# Patient Record
Sex: MALE | Race: WHITE
[De-identification: names, ages, dates, MRNs, and addresses within clinical notes are randomized per-mention and may not be internally consistent; named-entity substitution may affect disease eponyms.]

---

## 2020-01-24 ENCOUNTER — HOSPITAL ENCOUNTER (EMERGENCY)
Dept: HOSPITAL 74 - FER | Age: 50
Discharge: HOME | End: 2020-01-24
Payer: COMMERCIAL

## 2020-01-24 VITALS — DIASTOLIC BLOOD PRESSURE: 80 MMHG | TEMPERATURE: 98.3 F | SYSTOLIC BLOOD PRESSURE: 139 MMHG | HEART RATE: 71 BPM

## 2020-01-24 VITALS — BODY MASS INDEX: 24.5 KG/M2

## 2020-01-24 DIAGNOSIS — R10.32: Primary | ICD-10-CM

## 2020-01-24 DIAGNOSIS — R11.0: ICD-10-CM

## 2020-01-24 LAB
ALBUMIN SERPL-MCNC: 4 G/DL (ref 3.4–5)
ALP SERPL-CCNC: 54 U/L (ref 45–117)
ALT SERPL-CCNC: 32 U/L (ref 13–61)
ANION GAP SERPL CALC-SCNC: 5 MMOL/L (ref 8–16)
AST SERPL-CCNC: 28 U/L (ref 15–37)
BASOPHILS # BLD: 0.3 % (ref 0–2)
BILIRUB SERPL-MCNC: 0.7 MG/DL (ref 0.2–1)
BUN SERPL-MCNC: 12 MG/DL (ref 7–18)
CALCIUM SERPL-MCNC: 8.9 MG/DL (ref 8.5–10)
CHLORIDE SERPL-SCNC: 104 MMOL/L (ref 98–107)
CO2 SERPL-SCNC: 28 MMOL/L (ref 21–32)
CREAT SERPL-MCNC: 0.9 MG/DL (ref 0.55–1.3)
DEPRECATED RDW RBC AUTO: 13.2 % (ref 11.9–15.9)
EOSINOPHIL # BLD: 2.8 % (ref 0–4.5)
GLUCOSE SERPL-MCNC: 117 MG/DL (ref 74–106)
HCT VFR BLD CALC: 41.4 % (ref 35.4–49)
HGB BLD-MCNC: 13.3 GM/DL (ref 11.7–16.9)
LYMPHOCYTES # BLD: 17.6 % (ref 8–40)
MCH RBC QN AUTO: 25.9 PG (ref 25.7–33.7)
MCHC RBC AUTO-ENTMCNC: 32.1 G/DL (ref 32–35.9)
MCV RBC: 80.7 FL (ref 80–96)
MONOCYTES # BLD AUTO: 7.6 % (ref 3.8–10.2)
NEUTROPHILS # BLD: 71.7 % (ref 42.8–82.8)
PLATELET # BLD AUTO: 170 K/MM3 (ref 134–434)
PMV BLD: 9 FL (ref 7.5–11.1)
POTASSIUM SERPLBLD-SCNC: 4.2 MMOL/L (ref 3.5–5.1)
PROT SERPL-MCNC: 7.2 G/DL (ref 6.4–8.2)
RBC # BLD AUTO: 5.13 M/MM3 (ref 4–5.6)
SODIUM SERPL-SCNC: 137 MMOL/L (ref 136–145)
WBC # BLD AUTO: 6.7 K/MM3 (ref 4–10.8)

## 2020-01-24 PROCEDURE — 3E033NZ INTRODUCTION OF ANALGESICS, HYPNOTICS, SEDATIVES INTO PERIPHERAL VEIN, PERCUTANEOUS APPROACH: ICD-10-PCS

## 2020-01-24 NOTE — PDOC
History of Present Illness





- General


Chief Complaint: Pain


Stated Complaint: RLQ PAIN


Time Seen by Provider: 01/24/20 15:54





- History of Present Illness


Initial Comments: 





01/24/20 16:17


49-year-old male with a history of benign prostatic hypertrophy presents to the 

emergency department with left lower quadrant pain since this morning.  Patient 

reports associated nausea and states at times the pain radiates from his left 

lower back.  He denies any history of similar pain.  He reports the pain 

improves when he urinates.  Denies any fevers or chills.  Reports upper 

respiratory infectious symptoms for the last 4 days including runny nose and 

cough but states the symptoms have been improving.  Denies any dysuria or blood 

in the urine.  Denies any history of renal colic. Denies testicular or penile 

pain. No treatments were tried.  Of note patient saw urologist yesterday for 

enlarged prostate and was prescribed tamsulosin and finasteride which he took 

last night.





Denies any headaches, dizziness, focal weakness or numbness.  Denies any chest 

pain or shortness of breath.  Denies any diarrhea or constipation.  Denies any 

lower extremity edema or rashes.








Past History





- Past Medical History


Allergies/Adverse Reactions: 


 Allergies











Allergy/AdvReac Type Severity Reaction Status Date / Time


 


No Known Allergies Allergy   Unverified 01/24/20 14:49











Home Medications: 


Ambulatory Orders





Finasteride [Proscar -] 5 mg PO DAILY 01/24/20 


Tamsulosin HCl [Flomax] 0.4 mg PO DAILY 01/24/20 











**Review of Systems





- Review of Systems


Comments:: 





01/24/20 16:23


GENERAL/CONSTITUTIONAL: No fever or chills. No weakness.


HEAD, EYES, EARS, NOSE AND THROAT: No change in vision. No ear pain or 

discharge. No sore throat.


GASTROINTESTINAL: +nausea, no vomiting, diarrhea or constipation.


GENITOURINARY: No dysuria, frequency, or change in urination.


CARDIOVASCULAR: No chest pain or shortness of breath.


RESPIRATORY: +cough, no wheezing, or hemoptysis.


MUSCULOSKELETAL: No joint or muscle swelling or pain. No neck or back pain.


SKIN: No rash


NEUROLOGIC: No headache, vertigo, loss of consciousness, or change in strength/

sensation.


ENDOCRINE: No increased thirst. No abnormal weight change.


HEMATOLOGIC/LYMPHATIC: No anemia, easy bleeding, or history of blood clots.


ALLERGIC/IMMUNOLOGIC: No hives or skin allergy.





*Physical Exam





- Physical Exam





01/24/20 16:24


GENERAL: Awake, alert, and fully oriented, in no acute distress. Well appearing

, conversing with family


EYES: PERRLA, EOMI, sclera anicteric, conjunctiva clear


ENT:Oropharynx clear without exudates. Moist mucosa


NECK: Normal ROM, supple, no lymphadenopathy, JVD, or masses


LUNGS: Breath sounds equal, clear to auscultation bilaterally.  No wheezes, and 

no crackles


HEART: Regular rate and rhythm, normal S1 and S2, no murmurs, rubs or gallops


ABDOMEN: Soft, nontender, normoactive bowel sounds.  No guarding, no rebound.  

No masses. No CVAT


: No testicular to penile ttp. Normal cremasteric reflex.


EXTREMITIES: Normal range of motion, no edema.  No clubbing or cyanosis. No 

cords, erythema, or tenderness


NEUROLOGICAL:  Normal speech, cranial nerves intact, equal strength and 

sensation b/l


SKIN: Warm, Dry, normal turgor, no rashes or lesions noted.





ED Treatment Course





- LABORATORY


CBC & Chemistry Diagram: 


 01/24/20 16:25





 01/24/20 16:25





- ADDITIONAL ORDERS


Additional order review: 


 Laboratory  Results











  01/24/20





  15:00


 


Urine Color  Yellow


 


Urine Appearance  Clear


 


Urine pH  6.5


 


Urine Protein  Negative


 


Urine Glucose (UA)  Negative


 


Urine Ketones  Negative


 


Urine Blood  Negative


 


Urine Nitrite  Negative


 


Urine Bilirubin  Negative


 


Urine Urobilinogen  0.2


 


Ur Leukocyte Esterase  Negative














- RADIOLOGY


Radiology Studies Ordered: 














 Category Date Time Status


 


 SPIRAL- RENAL-STONE CT [CT] Stat CT Scan  01/24/20 16:13 Ordered














Medical Decision Making





- Medical Decision Making





01/24/20 16:26


49-year-old male presents the emergency department with left lower quadrant 

pain at times radiating from the left lower back, improves with voiding.


Most likely renal colic however, will also consider UTI versus diverticulitis 

versus musculoskeletal pain.


It is possible that the patient's recent initiation of tamsulosin and 

finasteride has caused a kidney stone to become dislodged.


Plan for labs, urinalysis, and spiral CT.  IV Tylenol for pain control.  Will 

reassess.





01/24/20 18:52


Labs, urinalysis, and spiral CT all unremarkable.


Impression: Musculoskeletal pain versus passed kidney stone





Patient is currently asymptomatic.  He is well-appearing and clinically stable 

for discharge home.





I discussed the physical exam findings, ancillary test results and final 

diagnoses with the patient. I answered all of the patient's questions. The 

patient was satisfied with the care received and felt comfortable with the 

discharge plan and treatment plan. The patient will call their primary care 

physician within 24 hours to arrange follow-up and will return to the Emergency 

Department with any new, persistent or worsening symptoms. 





Discharge





- Discharge Information


Problems reviewed: Yes


Clinical Impression/Diagnosis: 


 Flank pain, acute, LLQ pain, Nausea





Condition: Improved


Disposition: HOME





- Admission


No





- Follow up/Referral





- Patient Discharge Instructions


Patient Printed Discharge Instructions:  DI for Abdominal Pain-Adult


Additional Instructions: 


Follow-up with your primary care doctor within 2 to 3 days.





Return to the emergency department if you have any new, worsening or concerning 

symptoms.





- Post Discharge Activity